# Patient Record
(demographics unavailable — no encounter records)

---

## 2025-04-22 NOTE — HISTORY OF PRESENT ILLNESS
[de-identified] : Patient presenting for biliary colic.  Seen this morning in the emergency department at [place] for the same.  Found at that time to have an 11,000 white count and ultrasound demonstrating gallstones without evidence of acute cholecystitis. Patient discharged for outpatient referral presents to my office to discuss further management and plans for surgical intervention.

## 2025-04-22 NOTE — ASSESSMENT
[FreeTextEntry1] : Labs and imaging from this morning been personally reviewed and discussed with the patient.  Findings demonstrate cholelithiasis without evidence of acute cholecystitis.  Given the patient's recurrent symptoms, cholecystectomy is certainly recommended.  I have suggested a robotic assisted laparoscopic with ICG cholangiography.  Pertinent surgical anatomy and techniques have been reviewed and all questions have been answered.  Risk, Benefits, and Alternatives to surgery have been discussed.  This includes but is not limited to bleeding, infection, damage to adjacent structures, need for additional surgery or interventions, adverse effects of anesthesia such as cardio-respiratory complications, prolonged intubation, cardiac arrhythmia, arrest, and or death.  Risks of forgoing surgery have also been discussed including progression of, and/or worsening of current condition which may then require urgent or emergent treatment or surgery.  Presurgical testing can be arranged and she can be electively scheduled to undergo robotic assisted cholecystectomy.  Should the patient develop recurrent symptoms return to the emergency room may be necessary for further evaluation and expedited treatment.  Smoking Cessation has been encouraged.  Aside from the obvious respiratory and pulmonary risks, the implications of tobacco use on wound healing, micro vascular disease and surgical complications have been discussed.  The patient has been recommended to f/u with their PCP for further assistance as needed (5 minutes)  Will start on PPI for possible dyspepsia.  Should symptoms persist, consider GI consultation for EGD.

## 2025-04-22 NOTE — PHYSICAL EXAM
[Normal Breath Sounds] : Normal breath sounds [Normal Heart Sounds] : normal heart sounds [Normal Rate and Rhythm] : normal rate and rhythm [No Rash or Lesion] : No rash or lesion [Alert] : alert [Oriented to Person] : oriented to person [Oriented to Place] : oriented to place [Oriented to Time] : oriented to time [Calm] : calm [de-identified] : Healthy-appearing slender young woman in no acute distress [de-identified] : Soft, nontender nondistended, positive bowel sounds in all four quads.  No hernia or masses. No rebound or guarding. [de-identified] : NCAT, PERRLA, EOMI.  No scleral icterus or jaundice [de-identified] : Ambulating without difficulty or assistance